# Patient Record
Sex: MALE | Race: WHITE | Employment: STUDENT | URBAN - METROPOLITAN AREA
[De-identification: names, ages, dates, MRNs, and addresses within clinical notes are randomized per-mention and may not be internally consistent; named-entity substitution may affect disease eponyms.]

---
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
## 2021-05-18 ENCOUNTER — OFFICE VISIT (OUTPATIENT)
Dept: FAMILY MEDICINE CLINIC | Age: 21
End: 2021-05-18
Payer: COMMERCIAL

## 2021-05-18 VITALS
SYSTOLIC BLOOD PRESSURE: 112 MMHG | BODY MASS INDEX: 27.94 KG/M2 | HEART RATE: 66 BPM | DIASTOLIC BLOOD PRESSURE: 70 MMHG | OXYGEN SATURATION: 98 % | WEIGHT: 178 LBS | HEIGHT: 67 IN

## 2021-05-18 DIAGNOSIS — R00.2 PALPITATIONS: Primary | ICD-10-CM

## 2021-05-18 LAB
A/G RATIO: 1.8 (ref 1.1–2.2)
ALBUMIN SERPL-MCNC: 4.6 G/DL (ref 3.4–5)
ALP BLD-CCNC: 80 U/L (ref 40–129)
ALT SERPL-CCNC: 19 U/L (ref 10–40)
ANION GAP SERPL CALCULATED.3IONS-SCNC: 10 MMOL/L (ref 3–16)
AST SERPL-CCNC: 22 U/L (ref 15–37)
BASOPHILS ABSOLUTE: 0.1 K/UL (ref 0–0.2)
BASOPHILS RELATIVE PERCENT: 2.6 %
BILIRUB SERPL-MCNC: 0.5 MG/DL (ref 0–1)
BUN BLDV-MCNC: 13 MG/DL (ref 7–20)
CALCIUM SERPL-MCNC: 9.9 MG/DL (ref 8.3–10.6)
CHLORIDE BLD-SCNC: 103 MMOL/L (ref 99–110)
CO2: 27 MMOL/L (ref 21–32)
CREAT SERPL-MCNC: 0.9 MG/DL (ref 0.9–1.3)
EOSINOPHILS ABSOLUTE: 0.2 K/UL (ref 0–0.6)
EOSINOPHILS RELATIVE PERCENT: 4.3 %
GFR AFRICAN AMERICAN: >60
GFR NON-AFRICAN AMERICAN: >60
GLOBULIN: 2.5 G/DL
GLUCOSE BLD-MCNC: 89 MG/DL (ref 70–99)
HCT VFR BLD CALC: 40.5 % (ref 40.5–52.5)
HEMOGLOBIN: 13.9 G/DL (ref 13.5–17.5)
LYMPHOCYTES ABSOLUTE: 1.6 K/UL (ref 1–5.1)
LYMPHOCYTES RELATIVE PERCENT: 29.4 %
MAGNESIUM: 1.9 MG/DL (ref 1.8–2.4)
MCH RBC QN AUTO: 28.1 PG (ref 26–34)
MCHC RBC AUTO-ENTMCNC: 34.3 G/DL (ref 31–36)
MCV RBC AUTO: 82.1 FL (ref 80–100)
MONOCYTES ABSOLUTE: 0.4 K/UL (ref 0–1.3)
MONOCYTES RELATIVE PERCENT: 8.1 %
NEUTROPHILS ABSOLUTE: 3 K/UL (ref 1.7–7.7)
NEUTROPHILS RELATIVE PERCENT: 55.6 %
PDW BLD-RTO: 14.6 % (ref 12.4–15.4)
PLATELET # BLD: 332 K/UL (ref 135–450)
PMV BLD AUTO: 7.5 FL (ref 5–10.5)
POTASSIUM SERPL-SCNC: 4.6 MMOL/L (ref 3.5–5.1)
RBC # BLD: 4.94 M/UL (ref 4.2–5.9)
SODIUM BLD-SCNC: 140 MMOL/L (ref 136–145)
T4 FREE: 1.4 NG/DL (ref 0.9–1.8)
TOTAL PROTEIN: 7.1 G/DL (ref 6.4–8.2)
TSH SERPL DL<=0.05 MIU/L-ACNC: 5.39 UIU/ML (ref 0.43–4)
WBC # BLD: 5.4 K/UL (ref 4–11)

## 2021-05-18 PROCEDURE — 93000 ELECTROCARDIOGRAM COMPLETE: CPT | Performed by: FAMILY MEDICINE

## 2021-05-18 PROCEDURE — 99213 OFFICE O/P EST LOW 20 MIN: CPT | Performed by: FAMILY MEDICINE

## 2021-05-18 ASSESSMENT — ENCOUNTER SYMPTOMS: SHORTNESS OF BREATH: 1

## 2021-05-18 ASSESSMENT — PATIENT HEALTH QUESTIONNAIRE - PHQ9
1. LITTLE INTEREST OR PLEASURE IN DOING THINGS: 0
SUM OF ALL RESPONSES TO PHQ QUESTIONS 1-9: 0
SUM OF ALL RESPONSES TO PHQ9 QUESTIONS 1 & 2: 0

## 2021-05-18 NOTE — PROGRESS NOTES
Chief Complaint   Patient presents with    Irregular Heart Beat       HPI:  Harlan Tellez is a 24 y.o. (: 2000) here today   for irregular heart beat. Palpitations   This is a new problem. The problem occurs intermittently. The problem has been waxing and waning. The symptoms are aggravated by caffeine. Associated symptoms include an irregular heartbeat and shortness of breath (w/ exertion). Pertinent negatives include no chest pain or dizziness. He has tried nothing for the symptoms. The treatment provided no relief. Risk factors include being male. has been c/w exercise, approx 5 x per week. Has been using creatine and whey. Had used pre-workout in the past, but had stopped at least a week prior to symptoms. Palpitations did not seem to be aggravated by activity. Seems out of breath easier than normal w/ activity. Did have some chest discomfort when skipped beats more common. sxs were brief (few seconds), then recur. Would occur random times. No recent inc stressors. Had been consuming a sig amt of protein. Patient's medications, allergies, past medical, surgical, social and family histories were reviewed and updated as appropriate. ROS:  Review of Systems   Respiratory: Positive for shortness of breath (w/ exertion). Cardiovascular: Positive for palpitations. Negative for chest pain. Neurological: Negative for dizziness. Prior to Visit Medications    Not on File       Allergies   Allergen Reactions    Cat Hair Extract        OBJECTIVE:    /70   Pulse 66   Ht 5' 7.25\" (1.708 m)   Wt 178 lb (80.7 kg)   SpO2 98%   BMI 27.67 kg/m²     BP Readings from Last 2 Encounters:   21 112/70   20 110/72       Wt Readings from Last 3 Encounters:   21 178 lb (80.7 kg) (79 %, Z= 0.81)*   20 176 lb 6.4 oz (80 kg) (79 %, Z= 0.80)*   10/27/20 169 lb (76.7 kg) (72 %, Z= 0.59)*     * Growth percentiles are based on CDC (Boys, 2-20 Years) data. Physical Exam  Constitutional:       Appearance: Normal appearance. HENT:      Head: Normocephalic and atraumatic. Eyes:      Extraocular Movements: Extraocular movements intact. Cardiovascular:      Rate and Rhythm: Normal rate and regular rhythm. Heart sounds: No murmur heard. Pulmonary:      Effort: Pulmonary effort is normal.      Breath sounds: Normal breath sounds. Abdominal:      Palpations: Abdomen is soft. Tenderness: There is no abdominal tenderness. Musculoskeletal:      Right lower leg: No edema. Left lower leg: No edema. Neurological:      General: No focal deficit present. Mental Status: He is alert and oriented to person, place, and time. Psychiatric:         Mood and Affect: Mood normal.         Behavior: Behavior normal.           ASSESSMENT/PLAN:     1. Palpitations  sxs have improved. Were brief. ? Related to supplements.   Labs as below.  ekg normal.  Consider holter monitor and/or echo if ongoing sxs.   - TSH without Reflex  - T4, Free  - Comprehensive Metabolic Panel  - CBC Auto Differential  - MAGNESIUM  - EKG 12 lead

## 2021-05-19 DIAGNOSIS — R79.89 ELEVATED TSH: Primary | ICD-10-CM

## 2021-05-19 DIAGNOSIS — R79.89 ELEVATED TSH: ICD-10-CM

## 2021-05-19 LAB — T3 FREE: 3.9 PG/ML (ref 2.3–4.2)

## 2021-05-19 NOTE — RESULT ENCOUNTER NOTE
Tsh slightly elevated. Ft4 normal.  Doubt explains palpitations. See if can add ft3. Cbc, cmp, magnesium normal.  Consider rpt thyroid labs in 6-8 weeks.   If ongoing symptoms, consider holter monitor and echocardiogram

## 2021-05-20 DIAGNOSIS — R79.89 ELEVATED TSH: Primary | ICD-10-CM

## 2021-06-30 ENCOUNTER — NURSE ONLY (OUTPATIENT)
Dept: FAMILY MEDICINE CLINIC | Age: 21
End: 2021-06-30
Payer: COMMERCIAL

## 2021-06-30 DIAGNOSIS — R79.89 ELEVATED TSH: ICD-10-CM

## 2021-06-30 LAB
T4 FREE: 1.2 NG/DL (ref 0.9–1.8)
TSH SERPL DL<=0.05 MIU/L-ACNC: 6.13 UIU/ML (ref 0.43–4)

## 2021-06-30 PROCEDURE — 36415 COLL VENOUS BLD VENIPUNCTURE: CPT | Performed by: FAMILY MEDICINE

## 2021-07-01 DIAGNOSIS — R79.89 ELEVATED TSH: Primary | ICD-10-CM

## 2021-07-01 DIAGNOSIS — R79.89 ELEVATED TSH: ICD-10-CM

## 2021-07-01 LAB — T3 FREE: 3.6 PG/ML (ref 2.3–4.2)

## 2021-07-01 NOTE — RESULT ENCOUNTER NOTE
TSH still mildly elevated, free T4 still normal.  Please see if free T3 can be added.   May need to consider endocrinology referral.

## 2021-12-14 PROBLEM — E03.9 ACQUIRED HYPOTHYROIDISM: Status: ACTIVE | Noted: 2021-12-14

## 2021-12-15 ENCOUNTER — OFFICE VISIT (OUTPATIENT)
Dept: ENDOCRINOLOGY | Age: 21
End: 2021-12-15
Payer: COMMERCIAL

## 2021-12-15 VITALS
SYSTOLIC BLOOD PRESSURE: 125 MMHG | HEIGHT: 67 IN | TEMPERATURE: 98 F | RESPIRATION RATE: 14 BRPM | DIASTOLIC BLOOD PRESSURE: 78 MMHG | WEIGHT: 179.4 LBS | HEART RATE: 69 BPM | BODY MASS INDEX: 28.16 KG/M2 | OXYGEN SATURATION: 97 %

## 2021-12-15 DIAGNOSIS — E04.9 THYROID ENLARGEMENT: ICD-10-CM

## 2021-12-15 DIAGNOSIS — E03.9 ACQUIRED HYPOTHYROIDISM: Primary | ICD-10-CM

## 2021-12-15 DIAGNOSIS — R63.5 WEIGHT GAIN, ABNORMAL: ICD-10-CM

## 2021-12-15 PROCEDURE — 99204 OFFICE O/P NEW MOD 45 MIN: CPT | Performed by: INTERNAL MEDICINE

## 2021-12-15 NOTE — PROGRESS NOTES
SUBJECTIVE:  Kaylee Lance is a 22 y.o. male who is being evaluated for thyroid disease. 1. Acquired hypothyroidism  This started in 3/2021. Patient was diagnosed with hypothyroidism. The problem has been unchanged. Previous thyroid studies include: TSH and free thyroxine. Patient started medication in N/A. Currently patient is on: N/A. Misses N/A doses a month. Had SOB, skipping beats. Dx with thyroid disease. Repeated TSH abnormal.  Lasted 1-2 months. Sporadic   Last time 2 months does not have any palpitations  No chest pain. Never on medication  Has weekly headaches, moderate. Current complaints: denies fatigue, weight changes, heat/cold intolerance, bowel/skin changes or CVS symptoms  Has headaches    2. Thyroid enlargement  History of obstructive symptoms: difficulty swallowing No, changes in voice/hoarseness No.  History of radiation to patient's neck: No  Resent iodine exposure: No  Family history includes hypothyroidism. Family history of thyroid cancer: No    3. Weight gain, abnormal  Gained 30 lbs over 6 months. Same diet and exercise. Tried to loose weight. Has white stria. No easy bruising. Past Medical History:   Diagnosis Date    Hiatal hernia     Hypothyroidism     Kidney stones      Patient Active Problem List    Diagnosis Date Noted    Thyroid enlargement 12/15/2021    Weight gain, abnormal 12/15/2021    Acquired hypothyroidism 12/14/2021    Hydronephrosis 10/27/2020    Kidney stones     Hiatal hernia 09/29/2020     History reviewed. No pertinent surgical history.   Family History   Problem Relation Age of Onset    Kidney stones Father     Kidney stones Maternal Grandfather     Heart Disease Maternal Grandfather     Kidney stones Paternal Grandfather      Social History     Socioeconomic History    Marital status: Single     Spouse name: None    Number of children: None    Years of education: None    Highest education level: None   Occupational History  None   Tobacco Use    Smoking status: Never Smoker    Smokeless tobacco: Never Used   Vaping Use    Vaping Use: Never used   Substance and Sexual Activity    Alcohol use: Never    Drug use: Never    Sexual activity: None   Other Topics Concern    None   Social History Narrative    None     Social Determinants of Health     Financial Resource Strain:     Difficulty of Paying Living Expenses: Not on file   Food Insecurity:     Worried About Running Out of Food in the Last Year: Not on file    Ryan of Food in the Last Year: Not on file   Transportation Needs:     Lack of Transportation (Medical): Not on file    Lack of Transportation (Non-Medical): Not on file   Physical Activity:     Days of Exercise per Week: Not on file    Minutes of Exercise per Session: Not on file   Stress:     Feeling of Stress : Not on file   Social Connections:     Frequency of Communication with Friends and Family: Not on file    Frequency of Social Gatherings with Friends and Family: Not on file    Attends Spiritism Services: Not on file    Active Member of [address] [place] [address] [employer] or Organizations: Not on file    Attends Club or Organization Meetings: Not on file    Marital Status: Not on file   Intimate Partner Violence:     Fear of Current or Ex-Partner: Not on file    Emotionally Abused: Not on file    Physically Abused: Not on file    Sexually Abused: Not on file   Housing Stability:     Unable to Pay for Housing in the Last Year: Not on file    Number of [place] in the Last Year: Not on file    Unstable Housing in the Last Year: Not on file     No current outpatient medications on file. No current facility-administered medications for this visit.      Allergies   Allergen Reactions    Cat Hair Extract      Family Status   Relation Name Status    Mother  Alive    Father  Alive    MGM  Alive    MGF  Alive    PGM  Alive    PGF  Alive       Review of Systems:  Constitutional: no fatigue, no fever, has recent weight gain, no recent weight loss, has changes in appetite  Eyes: no eye pain, no change in vision, no eye redness, no eye irritation, no double vision  Ears, nose, throat: no nasal congestion, no sore throat, no earache, no decrease in hearing, no hoarseness, no dry mouth, no sinus problems, no difficulty swallowing, no neck lumps, no dental problems, no mouth sores, no ringing in ears  Pulmonary: no shortness of breath, no wheezing, no dyspnea on exertion, no cough  Cardiovascular: no chest pain, no lower extremity edema, no orthopnea, no intermittent leg claudication, no palpitations  Gastrointestinal: no abdominal pain, no nausea, no vomiting, no diarrhea, no constipation, no dysphagia, no heartburn, no bloating  Genitourinary: no dysuria, no urinary incontinence, no urinary hesitancy, no urinary frequency, no feelings of urinary urgency, no nocturia  Musculoskeletal: no joint swelling, no joint stiffness, no joint pain, has muscle cramps, no muscle pain  Integument/Breast: no skin rashes, no skin lesions, no itching, no dry skin  Neurological: no numbness, no tingling, no weakness, no confusion, has headaches, no dizziness, no fainting, no tremors, no decrease in memory, no balance problems  Psychiatric: has anxiety, no depression, no insomnia  Hematologic/Lymphatic: no tendency for easy bleeding, no swollen lymph nodes, no tendency for easy bruising  Immunology: no seasonal allergies, no frequent infections, no frequent illnesses  Endocrine: has temperature intolerance    /78   Pulse 69   Temp 98 °F (36.7 °C)   Resp 14   Ht 5' 7\" (1.702 m)   Wt 179 lb 6.4 oz (81.4 kg)   SpO2 97%   BMI 28.10 kg/m²    Wt Readings from Last 3 Encounters:   12/15/21 179 lb 6.4 oz (81.4 kg)   05/18/21 178 lb (80.7 kg) (79 %, Z= 0.81)*   12/28/20 176 lb 6.4 oz (80 kg) (79 %, Z= 0.80)*     * Growth percentiles are based on CDC (Boys, 2-20 Years) data. Body mass index is 28.1 kg/m².     OBJECTIVE:  Constitutional: no acute distress, well appearing and well nourished  Psychiatric: oriented to person, place and time, judgement and insight and normal, recent and remote memory and intact and mood and affect are normal  Skin: skin and subcutaneous tissue is normal without mass, normal turgor  Head and Face: examination of head and face revealed no abnormalities  Eyes: no lid or conjunctival swelling, erythema or discharge, pupils are normal, equal, round, reactive to light  Ears/Nose: external inspection of ears and nose revealed no abnormalities, hearing is grossly normal  Oropharynx/Mouth/Face: lips, tongue and gums are normal with no lesions, the voice quality was normal  Neck: neck is supple and symmetric, with midline trachea and no masses, thyroid is enlarged  Lymphatics: normal cervical lymph nodes, normal supraclavicular nodes  Pulmonary: no increased work of breathing or signs of respiratory distress, lungs are clear to auscultation  Cardiovascular: normal heart rate and rhythm, normal S1 and S2, no murmurs and pedal pulses and 2+ bilaterally, No edema  Abdomen: abdomen is soft, non-tender with no masses  Musculoskeletal: normal gait and station and exam of the digits and nails are normal  Neurological: normal coordination and normal general cortical function      Lab Review:    Lab Results   Component Value Date    WBC 5.4 05/18/2021    HGB 13.9 05/18/2021    HCT 40.5 05/18/2021    MCV 82.1 05/18/2021     05/18/2021     Lab Results   Component Value Date     05/18/2021    K 4.6 05/18/2021     05/18/2021    CO2 27 05/18/2021    BUN 13 05/18/2021    CREATININE 0.9 05/18/2021    GLUCOSE 89 05/18/2021    CALCIUM 9.9 05/18/2021    PROT 7.1 05/18/2021    LABALBU 4.6 05/18/2021    BILITOT 0.5 05/18/2021    ALKPHOS 80 05/18/2021    AST 22 05/18/2021    ALT 19 05/18/2021    LABGLOM >60 05/18/2021    GFRAA >60 05/18/2021    AGRATIO 1.8 05/18/2021    GLOB 2.5 05/18/2021     Lab Results   Component Value Date    TSH minutes    Return in about 1 month (around 1/15/2022) for thyroid problems.     Electronically signed by Montana Olivares MD on 12/17/2021 at 1:05 AM

## 2021-12-22 ENCOUNTER — OFFICE VISIT (OUTPATIENT)
Dept: FAMILY MEDICINE CLINIC | Age: 21
End: 2021-12-22
Payer: COMMERCIAL

## 2021-12-22 VITALS
HEART RATE: 72 BPM | HEIGHT: 67 IN | WEIGHT: 178.8 LBS | SYSTOLIC BLOOD PRESSURE: 112 MMHG | OXYGEN SATURATION: 98 % | BODY MASS INDEX: 28.06 KG/M2 | DIASTOLIC BLOOD PRESSURE: 62 MMHG

## 2021-12-22 DIAGNOSIS — E04.9 THYROID ENLARGEMENT: ICD-10-CM

## 2021-12-22 DIAGNOSIS — E03.9 ACQUIRED HYPOTHYROIDISM: ICD-10-CM

## 2021-12-22 DIAGNOSIS — F32.A DEPRESSION, UNSPECIFIED DEPRESSION TYPE: Primary | ICD-10-CM

## 2021-12-22 PROCEDURE — 99214 OFFICE O/P EST MOD 30 MIN: CPT | Performed by: FAMILY MEDICINE

## 2021-12-22 RX ORDER — ESCITALOPRAM OXALATE 5 MG/1
5 TABLET ORAL DAILY
Qty: 30 TABLET | Refills: 5 | Status: SHIPPED | OUTPATIENT
Start: 2021-12-22 | End: 2022-01-20 | Stop reason: SDUPTHER

## 2021-12-22 NOTE — PROGRESS NOTES
Chief Complaint   Patient presents with    Depression       HPI:  Luis Miguel Artist is a 22 y.o. (: 2000) here today   for issues with depression. HPI   Has had issues w/ mood. sxs over past 2-3 yrs. Feels down. No sig tearfulness. Dec enjoyment. Worse after breaking up w/ girlfriend. Has not been on medication in the past.  Has diff falling asleep. Sleeps late in the day at times. Has had issues w/ focus w/ school. Diff studying. Motivation issues as well. Prior thoughts of harming himself approx 1 mo ago. Fleeting thoughts. No plan. Has been out for college break. Seen by marybeth gonzalez thyroid. Plans on u/s. Plans on labs as well. ? Enlarged node to thyroid. Patient's medications, allergies, past medical, surgical, social and family histories were reviewed and updated as appropriate. ROS:  Review of Systems   Constitutional: Negative for fever. Psychiatric/Behavioral: Positive for decreased concentration, dysphoric mood and sleep disturbance. Negative for suicidal ideas.            No results found for: LABA1C, [facility], [address]    Past Medical History:   Diagnosis Date    Hiatal hernia     Hypothyroidism     Kidney stones        Family History   Problem Relation Age of Onset    Kidney stones Father     Kidney stones Maternal Grandfather     Heart Disease Maternal Grandfather     Kidney stones Paternal Grandfather        Social History     Socioeconomic History    Marital status: Single     Spouse name: Not on file    Number of children: Not on file    Years of education: Not on file    Highest education level: Not on file   Occupational History    Not on file   Tobacco Use    Smoking status: Never Smoker    Smokeless tobacco: Never Used   Vaping Use    Vaping Use: Never used   Substance and Sexual Activity    Alcohol use: Never    Drug use: Never    Sexual activity: Not on file   Other Topics Concern    Not on file   Social History Narrative    Not on file Social Determinants of Health     Financial Resource Strain:     Difficulty of Paying Living Expenses: Not on file   Food Insecurity:     Worried About Running Out of Food in the Last Year: Not on file    Ryan of Food in the Last Year: Not on file   Transportation Needs:     Lack of Transportation (Medical): Not on file    Lack of Transportation (Non-Medical): Not on file   Physical Activity:     Days of Exercise per Week: Not on file    Minutes of Exercise per Session: Not on file   Stress:     Feeling of Stress : Not on file   Social Connections:     Frequency of Communication with Friends and Family: Not on file    Frequency of Social Gatherings with Friends and Family: Not on file    Attends Latter-day Services: Not on file    Active Member of [address] [place] [address] [employer] or Organizations: Not on file    Attends Club or Organization Meetings: Not on file    Marital Status: Not on file   Intimate Partner Violence:     Fear of Current or Ex-Partner: Not on file    Emotionally Abused: Not on file    Physically Abused: Not on file    Sexually Abused: Not on file   Housing Stability:     Unable to Pay for Housing in the Last Year: Not on file    Number of [place] in the Last Year: Not on file    Unstable Housing in the Last Year: Not on file       Prior to Visit Medications    Medication Sig Taking? Authorizing Provider   escitalopram (LEXAPRO) 5 MG tablet Take 1 tablet by mouth daily Yes Luci Manley MD       Allergies   Allergen Reactions    Cat Hair Extract        OBJECTIVE:    /62   Pulse 72   Ht 5' 7\" (1.702 m)   Wt 178 lb 12.8 oz (81.1 kg)   SpO2 98%   BMI 28.00 kg/m²     BP Readings from Last 2 Encounters:   12/22/21 112/62   12/15/21 125/78       Wt Readings from Last 3 Encounters:   12/22/21 178 lb 12.8 oz (81.1 kg)   12/15/21 179 lb 6.4 oz (81.4 kg)   05/18/21 178 lb (80.7 kg) (79 %, Z= 0.81)*     * Growth percentiles are based on CDC (Boys, 2-20 Years) data.        Physical Exam  Constitutional:       Appearance: Normal appearance. HENT:      Head: Normocephalic and atraumatic. Eyes:      Extraocular Movements: Extraocular movements intact. Neck:      Thyroid: Thyromegaly present. Cardiovascular:      Rate and Rhythm: Normal rate and regular rhythm. Pulmonary:      Effort: Pulmonary effort is normal.      Breath sounds: Normal breath sounds. Skin:     General: Skin is warm and dry. Neurological:      General: No focal deficit present. Mental Status: He is alert and oriented to person, place, and time. Psychiatric:         Mood and Affect: Mood is depressed. Behavior: Behavior normal.           ASSESSMENT/PLAN:    1. Depression, unspecified depression type  Discussed symptoms and options. Pt interested in medication and counseling. Will refer for counseling. Add meds as below. F/u in 6 weeks. Stop med and call immediately if any SI.    - escitalopram (LEXAPRO) 5 MG tablet; Take 1 tablet by mouth daily  Dispense: 30 tablet; Refill: 5    2. Acquired hypothyroidism  Plans on labs as ordered by endo at n/v    3. Thyroid enlargement  Plans on u/s to eval further.

## 2021-12-30 LAB
CREAT SERPL-MCNC: 0.96 MG/DL
POTASSIUM (K+): 4.1
T3 FREE: 128
T4 FREE: 1.61
TSH SERPL DL<=0.05 MIU/L-ACNC: 4.23 UIU/ML

## 2022-01-05 DIAGNOSIS — Z00.00 WELLNESS EXAMINATION: Primary | ICD-10-CM

## 2022-01-05 NOTE — RESULT ENCOUNTER NOTE
TSH now within normal range, but in the upper end of normal.  Remainder of thyroid labs essentially normal.  Cortisol level normal.  CMP normal.  Please fax copies to his endocrinologist

## 2022-01-20 DIAGNOSIS — F32.A DEPRESSION, UNSPECIFIED DEPRESSION TYPE: ICD-10-CM

## 2022-01-20 RX ORDER — ESCITALOPRAM OXALATE 5 MG/1
5 TABLET ORAL DAILY
Qty: 30 TABLET | Refills: 5 | Status: SHIPPED | OUTPATIENT
Start: 2022-01-20 | End: 2022-02-25 | Stop reason: DRUGHIGH

## 2022-01-20 NOTE — TELEPHONE ENCOUNTER
----- Message from Chapin Dixon sent at 1/20/2022  5:43 PM EST -----  Subject: Refill Request    QUESTIONS  Name of Medication? escitalopram (LEXAPRO) 5 MG tablet  Patient-reported dosage and instructions? 5MG PER DAY   How many days do you have left? 1  Preferred Pharmacy? [id number]  Pharmacy phone number (if available)? [phone]  Additional Information for Provider? PT is due for a refill in 2 days,   wants to update his pharmacy to a new location   ---------------------------------------------------------------------------  --------------  [address]  What is the best way for the office to contact you? OK to leave message on   voicemail  Preferred Call Back Phone Number?  [phone]

## 2022-01-20 NOTE — TELEPHONE ENCOUNTER
----- Message from Michelle Keenan sent at 1/20/2022  5:43 PM EST -----  Subject: Refill Request    QUESTIONS  Name of Medication? escitalopram (LEXAPRO) 5 MG tablet  Patient-reported dosage and instructions? 5MG PER DAY   How many days do you have left? 1  Preferred Pharmacy? [id number]  Pharmacy phone number (if available)? [phone]  Additional Information for Provider? PT is due for a refill in 2 days,   wants to update his pharmacy to a new location   ---------------------------------------------------------------------------  --------------  [address]  What is the best way for the office to contact you? OK to leave message on   voicemail  Preferred Call Back Phone Number?  [phone]

## 2022-02-11 ENCOUNTER — HOSPITAL ENCOUNTER (OUTPATIENT)
Dept: ULTRASOUND IMAGING | Age: 22
Discharge: HOME | End: 2022-02-11
Payer: COMMERCIAL

## 2022-02-11 DIAGNOSIS — E04.9 THYROID ENLARGEMENT: ICD-10-CM

## 2022-02-11 PROCEDURE — 76536 US EXAM OF HEAD AND NECK: CPT

## 2022-02-25 ENCOUNTER — TELEMEDICINE (OUTPATIENT)
Dept: FAMILY MEDICINE CLINIC | Age: 22
End: 2022-02-25
Payer: COMMERCIAL

## 2022-02-25 DIAGNOSIS — F32.A DEPRESSION, UNSPECIFIED DEPRESSION TYPE: ICD-10-CM

## 2022-02-25 PROCEDURE — 99213 OFFICE O/P EST LOW 20 MIN: CPT | Performed by: FAMILY MEDICINE

## 2022-02-25 RX ORDER — ESCITALOPRAM OXALATE 10 MG/1
10 TABLET ORAL DAILY
Qty: 30 TABLET | Refills: 5 | Status: SHIPPED | OUTPATIENT
Start: 2022-02-25 | End: 2022-03-14 | Stop reason: SDUPTHER

## 2022-02-25 NOTE — PROGRESS NOTES
2022    TELEHEALTH EVALUATION -- Audio/Visual (During SHEHJ-36 public health emergency)    HPI:    Moi Kaur (:  2000) has requested an audio/video evaluation for the following concern(s):  Has been on lexapro approx 6 weeks. Mood overall better. Not \"always\" depressed. Some days better than others. Can occur randomly or may be related to stressors. No sig SE to meds. karsten well. Overall has seen some improvement. No sig anxiety sxs noted. Has sig diff falling asleep. On the bad days, will occas have fleeting thoughts of suicide. Had been seen by endo for thyroid. Had u/s. Unremarkable. No meds for thyroid. Review of Systems   Constitutional: Negative for fever. Psychiatric/Behavioral: Positive for dysphoric mood, sleep disturbance and suicidal ideas (fleeting, no plan). The patient is not nervous/anxious. Prior to Visit Medications    Medication Sig Taking?  Authorizing Provider   escitalopram (LEXAPRO) 10 MG tablet Take 1 tablet by mouth daily Yes Nitesh Sumner MD       Social History     Tobacco Use    Smoking status: Never Smoker    Smokeless tobacco: Never Used   Vaping Use    Vaping Use: Never used   Substance Use Topics    Alcohol use: Never    Drug use: Never        Allergies   Allergen Reactions    Cat Hair Extract    ,   Past Medical History:   Diagnosis Date    Hiatal hernia     Hypothyroidism     Kidney stones        PHYSICAL EXAMINATION:  [ INSTRUCTIONS:  \"[x]\" Indicates a positive item  \"[]\" Indicates a negative item  -- DELETE ALL ITEMS NOT EXAMINED]  Vital Signs: (As obtained by patient/caregiver or practitioner observation)    Blood pressure-  Heart rate-    Respiratory rate-    Temperature-  Pulse oximetry-     Constitutional: [] Appears well-developed and well-nourished [] No apparent distress      [] Abnormal-   Mental status  [x] Alert and awake  [x] Oriented to person/place/time []Able to follow commands      Eyes:  EOM    [x]  Normal [] Abnormal-  Sclera  []  Normal  [] Abnormal -         Discharge []  None visible  [] Abnormal -    HENT:   [x] Normocephalic, atraumatic. [] Abnormal   [] Mouth/Throat: Mucous membranes are moist.     External Ears [] Normal  [] Abnormal-     Neck: [] No visualized mass     Pulmonary/Chest: [x] Respiratory effort normal.  [] No visualized signs of difficulty breathing or respiratory distress        [] Abnormal-      Musculoskeletal:   [x] Normal gait with no signs of ataxia         [] Normal range of motion of neck        [] Abnormal-       Neurological:        [x] No Facial Asymmetry (Cranial nerve 7 motor function) (limited exam to video visit)          [] No gaze palsy        [] Abnormal-         Skin:        [x] No significant exanthematous lesions or discoloration noted on facial skin         [] Abnormal-            Psychiatric:       [x] Normal Affect [x] No Hallucinations        [] Abnormal-     Other pertinent observable physical exam findings-     Due to this being a TeleHealth encounter, evaluation of the following organ systems is limited: Vitals/Constitutional/EENT/Resp/CV/GI//MS/Neuro/Skin/Heme-Lymph-Imm. ASSESSMENT/PLAN:  1. Depression, unspecified depression type  Patient with some improvement with medication. Still some significantly depressed days. Denies any significant suicidal plan. Does have occasional fleeting thoughts. Overall improved. No side effects related to medications. Discussed the option of changing medications versus adjusting dose. Given he is tolerating it well and has had some improvement, increase to 10 mg daily. New prescription sent. If ongoing symptoms, could consider adding Wellbutrin or switching medication. Patient agrees with the plan. He will let us know how he does with increasing his Lexapro dose  - escitalopram (LEXAPRO) 10 MG tablet; Take 1 tablet by mouth daily  Dispense: 30 tablet;  Refill: 5    This document was prepared by a combination of typing and transcription through a voice recognition software. No follow-ups on file. An  electronic signature was used to authenticate this note. --Hayde Giles MD on 2/25/2022 at 12:41 PM        Pursuant to the emergency declaration under the [address] [place] [address] waiver authority and the [employer] and [employer] Act, this Virtual  Visit was conducted, with patient's consent, to reduce the patient's risk of exposure to COVID-19 and provide continuity of care for an established patient. Services were provided through a video synchronous discussion virtually to substitute for in-person clinic visit.

## 2022-03-14 DIAGNOSIS — F32.A DEPRESSION, UNSPECIFIED DEPRESSION TYPE: ICD-10-CM

## 2022-03-14 RX ORDER — ESCITALOPRAM OXALATE 10 MG/1
10 TABLET ORAL DAILY
Qty: 30 TABLET | Refills: 5 | Status: SHIPPED | OUTPATIENT
Start: 2022-03-14 | End: 2022-04-04 | Stop reason: SDUPTHER

## 2022-04-04 DIAGNOSIS — F32.A DEPRESSION, UNSPECIFIED DEPRESSION TYPE: ICD-10-CM

## 2022-04-04 RX ORDER — ESCITALOPRAM OXALATE 10 MG/1
10 TABLET ORAL DAILY
Qty: 90 TABLET | Refills: 3 | Status: SHIPPED | OUTPATIENT
Start: 2022-04-04

## 2022-05-09 ENCOUNTER — TELEMEDICINE (OUTPATIENT)
Dept: FAMILY MEDICINE CLINIC | Age: 22
End: 2022-05-09
Payer: COMMERCIAL

## 2022-05-09 DIAGNOSIS — F32.A DEPRESSION, UNSPECIFIED DEPRESSION TYPE: Primary | ICD-10-CM

## 2022-05-09 PROCEDURE — 99213 OFFICE O/P EST LOW 20 MIN: CPT | Performed by: FAMILY MEDICINE

## 2022-05-09 RX ORDER — BUPROPION HYDROCHLORIDE 150 MG/1
150 TABLET ORAL EVERY MORNING
Qty: 30 TABLET | Refills: 3 | Status: SHIPPED | OUTPATIENT
Start: 2022-05-09 | End: 2022-08-15

## 2022-05-09 RX ORDER — MINOCYCLINE HYDROCHLORIDE 100 MG/1
CAPSULE ORAL
COMMUNITY
Start: 2022-04-29

## 2022-05-09 ASSESSMENT — ENCOUNTER SYMPTOMS: SHORTNESS OF BREATH: 0

## 2022-05-09 NOTE — PROGRESS NOTES
2022    TELEHEALTH EVALUATION -- Audio/Visual (During OAXCQ-95 public health emergency)    HPI:    Karla Rao (:  2000) has requested an audio/video evaluation for the following concern(s):    Still having some issues w/ mood. Has gained some weight as well. Wonders if related to med. Also hungry all of the time. Has overall seen some improvement w/ lexapro, but still some poor mood on occas. Mood did not improve much w/ inc lexapro dose. Has had some thoughts of that if he were dead, wouldn't have to worry about things. No suicidal thoughts. Review of Systems   Constitutional: Negative for fever. Respiratory: Negative for shortness of breath. Psychiatric/Behavioral: Positive for decreased concentration and dysphoric mood. Negative for suicidal ideas. Prior to Visit Medications    Medication Sig Taking?  Authorizing Provider   minocycline (MINOCIN;DYNACIN) 100 MG capsule  Yes Historical Provider, MD   buPROPion (WELLBUTRIN XL) 150 MG extended release tablet Take 1 tablet by mouth every morning Yes Ronn Davila MD   escitalopram (LEXAPRO) 10 MG tablet Take 1 tablet by mouth daily Yes YENI Campos - CNP       Social History     Tobacco Use    Smoking status: Never Smoker    Smokeless tobacco: Never Used   Vaping Use    Vaping Use: Never used   Substance Use Topics    Alcohol use: Never    Drug use: Never        Allergies   Allergen Reactions    Cat Hair Extract    ,   Past Medical History:   Diagnosis Date    Hiatal hernia     Hypothyroidism     Kidney stones        PHYSICAL EXAMINATION:  [ INSTRUCTIONS:  \"[x]\" Indicates a positive item  \"[]\" Indicates a negative item  -- DELETE ALL ITEMS NOT EXAMINED]  Vital Signs: (As obtained by patient/caregiver or practitioner observation)    Blood pressure-  Heart rate-    Respiratory rate-    Temperature-  Pulse oximetry-     Constitutional: [x] Appears well-developed and well-nourished [] No apparent distress      [] Abnormal-   Mental status  [x] Alert and awake  [x] Oriented to person/place/time []Able to follow commands      Eyes:  EOM    [x]  Normal  [] Abnormal-  Sclera  []  Normal  [] Abnormal -         Discharge []  None visible  [] Abnormal -    HENT:   [x] Normocephalic, atraumatic. [] Abnormal   [] Mouth/Throat: Mucous membranes are moist.     External Ears [x] Normal  [] Abnormal-     Neck: [] No visualized mass     Pulmonary/Chest: [x] Respiratory effort normal.  [] No visualized signs of difficulty breathing or respiratory distress        [] Abnormal-      Musculoskeletal:   [] Normal gait with no signs of ataxia         [] Normal range of motion of neck        [] Abnormal-       Neurological:        [x] No Facial Asymmetry (Cranial nerve 7 motor function) (limited exam to video visit)          [] No gaze palsy        [] Abnormal-         Skin:        [x] No significant exanthematous lesions or discoloration noted on facial skin         [] Abnormal-            Psychiatric:       [x] Normal Affect [x] No Hallucinations        [] Abnormal-     Other pertinent observable physical exam findings-     Due to this being a TeleHealth encounter, evaluation of the following organ systems is limited: Vitals/Constitutional/EENT/Resp/CV/GI//MS/Neuro/Skin/Heme-Lymph-Imm. ASSESSMENT/PLAN:  1. Depression, unspecified depression type  Overall improved, but still some issues and mood. Patient does not feel that 10 mg of Lexapro is doing any better for him than 5 mg did. We had previously discussed adding Wellbutrin versus changing medications. He would prefer to try adding Wellbutrin. We will reduce Lexapro back to 5 mg. He denies any significant suicidal ideation. He has had some thoughts that if he were dead he would not have to worry about things, but has no significant idea or plan. Patient to call if does not tolerate medicine in the interim. Otherwise follow-up in approximately 6 weeks.   - buPROPion [facility] XL) 150 MG extended release tablet; Take 1 tablet by mouth every morning  Dispense: 30 tablet; Refill: 3      No follow-ups on file. An  electronic signature was used to authenticate this note. --Annelise Abbott MD on 5/9/2022 at 4:42 PM        Pursuant to the emergency declaration under the [address] [place] [address] [facility] authority and the [email or web address] and [employer] Act, this Virtual  Visit was conducted, with patient's consent, to reduce the patient's risk of exposure to COVID-19 and provide continuity of care for an established patient. Services were provided through a video synchronous discussion virtually to substitute for in-person clinic visit.

## 2022-06-20 ENCOUNTER — TELEMEDICINE (OUTPATIENT)
Dept: FAMILY MEDICINE CLINIC | Age: 22
End: 2022-06-20
Payer: COMMERCIAL

## 2022-06-20 DIAGNOSIS — F32.A DEPRESSION, UNSPECIFIED DEPRESSION TYPE: Primary | ICD-10-CM

## 2022-06-20 PROCEDURE — 99213 OFFICE O/P EST LOW 20 MIN: CPT | Performed by: FAMILY MEDICINE

## 2022-06-20 NOTE — PROGRESS NOTES
2022    TELEHEALTH EVALUATION -- Audio/Visual (During [place] public health emergency)    HPI:    Juan Manuel Villeda (:  2000) has requested an audio/video evaluation for the following concern(s):    Heading to concert. Going w/ his sister. Last seen on . Overall about the same. Maybe a little better. Added wellbutrin last visit. Currently on 5mg of lexapro. He has seen some mild improvement with medication. He did not see much improvement with increasing Lexapro dose in the past.    Considering accutane. Seeing derm. Review of Systems   Psychiatric/Behavioral: Positive for dysphoric mood. Negative for self-injury. Prior to Visit Medications    Medication Sig Taking? Authorizing Provider   minocycline (MINOCIN;DYNACIN) 100 MG capsule  Yes Historical Provider, MD   buPROPion (WELLBUTRIN XL) 150 MG extended release tablet Take 1 tablet by mouth every morning Yes Kirstie Davidson MD   escitalopram (LEXAPRO) 10 MG tablet Take 1 tablet by mouth daily Yes YENI Correia - CNP       Social History     Tobacco Use    Smoking status: Never Smoker    Smokeless tobacco: Never Used   Vaping Use    Vaping Use: Never used   Substance Use Topics    Alcohol use: Never    Drug use: Never        Allergies   Allergen Reactions    Cat Hair Extract    ,   Past Medical History:   Diagnosis Date    Hiatal hernia     Hypothyroidism     Kidney stones    , History reviewed. No pertinent surgical history.     PHYSICAL EXAMINATION:  [ INSTRUCTIONS:  \"[x]\" Indicates a positive item  \"[]\" Indicates a negative item  -- DELETE ALL ITEMS NOT EXAMINED]  Vital Signs: (As obtained by patient/caregiver or practitioner observation)    Blood pressure-  Heart rate-    Respiratory rate-    Temperature-  Pulse oximetry-     Constitutional: [] Appears well-developed and well-nourished [] No apparent distress      [] Abnormal-   Mental status  [] Alert and awake  [] Oriented to person/place/time []Able to follow commands      Eyes:  EOM    []  Normal  [] Abnormal-  Sclera  []  Normal  [] Abnormal -         Discharge []  None visible  [] Abnormal -    HENT:   [] Normocephalic, atraumatic. [] Abnormal   [] Mouth/Throat: Mucous membranes are moist.     External Ears [] Normal  [] Abnormal-     Neck: [] No visualized mass     Pulmonary/Chest: [] Respiratory effort normal.  [] No visualized signs of difficulty breathing or respiratory distress        [] Abnormal-      Musculoskeletal:   [] Normal gait with no signs of ataxia         [] Normal range of motion of neck        [] Abnormal-       Neurological:        [] No Facial Asymmetry (Cranial nerve 7 motor function) (limited exam to video visit)          [] No gaze palsy        [] Abnormal-         Skin:        [] No significant exanthematous lesions or discoloration noted on facial skin         [] Abnormal-            Psychiatric:       [] Normal Affect [] No Hallucinations        [] Abnormal-     Other pertinent observable physical exam findings-     Due to this being a TeleHealth encounter, evaluation of the following organ systems is limited: Vitals/Constitutional/EENT/Resp/CV/GI//MS/Neuro/Skin/Heme-Lymph-Imm. ASSESSMENT/PLAN:  1. Depression, unspecified depression type  Overall, he has seen some improvement. Still with some symptoms and not at goal.  We discussed options of increasing either the Lexapro or the Wellbutrin, staying the same on current medications and monitoring his symptoms given he has had some improvement, or switching medicines altogether. If he were to switch medications, I would recommend Gene[employer]ight testing prior to the change. Patient decided to continue with current medications at this time feeling that he is tolerating them well and is overall seeing some improvement.   I did encourage him to arrange a follow-up appointment in approximately a month if he does wish to change medications, that would allow us to make the medication adjustment prior to him starting back to school this fall. Patient agrees with the plan. Follow-up with dermatology as scheduled regarding acne    Return in about 6 weeks (around 8/1/2022). An  electronic signature was used to authenticate this note. --Triston Pires MD on 6/20/2022 at 4:44 PM        Pursuant to the emergency declaration under the [address] [place] [address], [facility] waiver authority and the [email or web address] Act, this Virtual  Visit was conducted, with patient's consent, to reduce the patient's risk of exposure to COVID-19 and provide continuity of care for an established patient. Services were provided through a video synchronous discussion virtually to substitute for in-person clinic visit.

## 2022-06-21 PROBLEM — R73.01 ELEVATED FASTING GLUCOSE: Status: ACTIVE | Noted: 2022-06-21

## 2022-08-12 DIAGNOSIS — F32.A DEPRESSION, UNSPECIFIED DEPRESSION TYPE: ICD-10-CM

## 2022-08-15 RX ORDER — BUPROPION HYDROCHLORIDE 150 MG/1
TABLET ORAL
Qty: 90 TABLET | Refills: 3 | Status: SHIPPED | OUTPATIENT
Start: 2022-08-15